# Patient Record
Sex: MALE | Race: BLACK OR AFRICAN AMERICAN | Employment: UNEMPLOYED | URBAN - METROPOLITAN AREA
[De-identification: names, ages, dates, MRNs, and addresses within clinical notes are randomized per-mention and may not be internally consistent; named-entity substitution may affect disease eponyms.]

---
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
## 2019-09-02 ENCOUNTER — APPOINTMENT (OUTPATIENT)
Dept: GENERAL RADIOLOGY | Age: 19
End: 2019-09-02
Attending: EMERGENCY MEDICINE
Payer: COMMERCIAL

## 2019-09-02 ENCOUNTER — HOSPITAL ENCOUNTER (EMERGENCY)
Age: 19
Discharge: HOME | End: 2019-09-02
Attending: EMERGENCY MEDICINE
Payer: COMMERCIAL

## 2019-09-02 VITALS
WEIGHT: 268.08 LBS | TEMPERATURE: 98.7 F | RESPIRATION RATE: 14 BRPM | SYSTOLIC BLOOD PRESSURE: 135 MMHG | HEART RATE: 63 BPM | DIASTOLIC BLOOD PRESSURE: 77 MMHG | BODY MASS INDEX: 33.33 KG/M2 | HEIGHT: 75 IN | OXYGEN SATURATION: 100 %

## 2019-09-02 DIAGNOSIS — S83.92XA SPRAIN OF LEFT KNEE, UNSPECIFIED LIGAMENT, INITIAL ENCOUNTER: Primary | ICD-10-CM

## 2019-09-02 PROCEDURE — 73562 X-RAY EXAM OF KNEE 3: CPT

## 2019-09-02 PROCEDURE — 99284 EMERGENCY DEPT VISIT MOD MDM: CPT

## 2019-09-02 PROCEDURE — L1830 KO IMMOB CANVAS LONG PRE OTS: HCPCS

## 2019-09-02 NOTE — ED NOTES
The patient was discharged home  in stable condition. The patient is alert and oriented, in no respiratory distress and discharge vital signs obtained. The patient's diagnosis, condition and treatment were explained. The patient expressed understanding. No prescriptions given. No work/school note given. A discharge plan has been developed. A  was not involved in the process. Aftercare instructions were given. Pt ambulatory out of the ED wearing knee immobilizer. Pt discharged from the ED with family.

## 2019-09-02 NOTE — DISCHARGE INSTRUCTIONS
Patient Education        Knee Sprain: Care Instructions  Your Care Instructions    A knee sprain is one or more stretched, partly torn, or completely torn knee ligaments. Ligaments are bands of ropelike tissue that connect bone to bone and make the knee stable. The knee has four main ligaments. Knee sprains often happen because of a twisting or bending injury from sports such as skiing, basketball, soccer, or football. The knee turns one way while the lower or upper leg goes another way. A sprain also can happen when the knee is hit from the side or the front. If a knee ligament is slightly stretched, you will probably need only home treatment. You may need a splint or brace (immobilizer) for a partly torn ligament. A complete tear may need surgery. A minor knee sprain may take up to 6 weeks to heal, while a severe sprain may take months. Follow-up care is a key part of your treatment and safety. Be sure to make and go to all appointments, and call your doctor if you are having problems. It's also a good idea to know your test results and keep a list of the medicines you take. How can you care for yourself at home? · Follow instructions about how much weight you can put on your leg and how to walk with crutches. · Prop up your leg on a pillow when you ice it or anytime you sit or lie down for the next 3 days. Try to keep it above the level of your heart. This will help reduce swelling. · Put ice or a cold pack on your knee for 10 to 20 minutes at a time. Try to do this every 1 to 2 hours for the next 3 days (when you are awake) or until the swelling goes down. Put a thin cloth between the ice and your skin. Do not get the splint wet. · If you have an elastic bandage, make sure it is snug but not so tight that your leg is numb, tingles, or swells below the bandage. You can loosen the bandage if it is too tight. · Your doctor may recommend a brace (immobilizer) to support your knee while it heals.  Wear it as directed. · Ask your doctor if you can take an over-the-counter pain medicine, such as acetaminophen (Tylenol), ibuprofen (Advil, Motrin), or naproxen (Aleve). Be safe with medicines. Read and follow all instructions on the label. When should you call for help? Call 911 anytime you think you may need emergency care. For example, call if:    · You have sudden chest pain and shortness of breath, or you cough up blood.    Call your doctor now or seek immediate medical care if:    · You have increased or severe pain.     · You cannot move your toes or ankle.     · Your foot is cool or pale or changes color.     · You have tingling, weakness, or numbness in your foot or leg.     · Your splint or brace feels too tight.     · You are unable to straighten the knee, or the knee \"locks. \"     · You have signs of a blood clot in your leg, such as:  ? Pain in your calf, back of the knee, thigh, or groin. ? Redness and swelling in your leg.    Watch closely for changes in your health, and be sure to contact your doctor if:    · Your pain is not getting better or is getting worse. Where can you learn more? Go to http://jose-maximino.info/. Enter N406 in the search box to learn more about \"Knee Sprain: Care Instructions. \"  Current as of: September 20, 2018  Content Version: 12.1  © 0003-9452 [employer]. Care instructions adapted under license by [employer] (which disclaims liability or warranty for this information). If you have questions about a medical condition or this instruction, always ask your healthcare professional. [place] [address] any warranty or liability for your use of this information. Patient Education        Learning About RICE (Rest, Ice, Compression, and Elevation)  What is RICE? RICE is a way to care for an injury. RICE helps relieve pain and swelling. It may also help with healing and flexibility.  RICE stands for:  · Rest and protect the injured or sore area. · Ice or a cold pack used as soon as possible. · Compression, or wrapping the injured or sore area with an elastic bandage. · Elevation (propping up) the injured or sore area. How do you do RICE? You can use RICE for home treatment when you have general aches and pains or after an injury or surgery. Rest  · Do not put weight on the injury for at least 24 to 48 hours. · Use crutches for a badly sprained knee or ankle. · Support a sprained wrist, elbow, or shoulder with a sling. Ice  · Put ice or a cold pack on the injury right away to reduce pain and swelling. Frozen vegetables will also work as an ice pack. Put a thin cloth between the ice or cold pack and your skin. The cloth protects the injured area from getting too cold. · Use ice for 10 to 15 minutes at a time for the first 48 to 72 hours. Compression  · Use compression for sprains, strains, and surgeries of the arms and legs. · Wrap the injured area with an elastic bandage or compression sleeve to reduce swelling. · Don't wrap it too tightly. If the area below it feels numb, tingles, or feels cool, loosen the wrap. Elevation  · Use elevation for areas of the body that can be propped up, such as arms and legs. · Prop up the injured area on pillows whenever you use ice. Keep it propped up anytime you sit or lie down. · Try to keep the injured area at or above the level of your heart. This will help reduce swelling and bruising. Where can you learn more? Go to http://jose-maximino.info/. Enter G431 in the search box to learn more about \"Learning About RICE (Rest, Ice, Compression, and Elevation). \"  Current as of: September 20, 2018  Content Version: 12.1  © 0471-2795 [employer]. Care instructions adapted under license by [employer] (which disclaims liability or warranty for this information).  If you have questions about a medical condition or this instruction, always ask your healthcare professional. [place] [address] any warranty or liability for your use of this information. We hope that we have addressed all of your medical concerns. The examination and treatment you received in the Emergency Department were for an emergent problem and were not intended as complete care. It is important that you follow up with your healthcare provider(s) for ongoing care. If your symptoms worsen or do not improve as expected, and you are unable to reach your usual health care provider(s), you should return to the Emergency Department. Today's healthcare is undergoing tremendous change, and patient satisfaction surveys are one of the many tools to assess the quality of medical care. You may receive a survey from the [facility] regarding your experience in the Emergency Department. I hope that your experience has been completely positive, particularly the medical care that I provided. As such, please participate in the survey; anything less than excellent does not meet my expectations or intentions. [facility] and [address] [place] [address] participate in nationally recognized quality of care measures. If your blood pressure is greater than 120/80, as reported below, we urge that you seek medical care to address the potential of high blood pressure, commonly known as hypertension. Hypertension can be hereditary or can be caused by certain medical conditions, pain, stress, or \"white coat syndrome. \"       Please make an appointment with your health care provider(s) for follow up of your Emergency Department visit. VITALS:   Patient Vitals for the past 8 hrs:   Temp Pulse Resp BP SpO2   09/02/19 1700 98.7 °F (37.1 °C) 63 14 135/77 100 %          Thank you for allowing us to provide you with medical care today. We realize that you have many choices for your emergency care needs.   Please choose us in the future for any continued health care needs. [facility] Oneida Young, [address]: [phone]            No results found for this or any previous visit (from the past 24 hour(s)). Xr Knee Lt 3 V    Result Date: 9/2/2019  EXAM: XR KNEE LT 3 V INDICATION: injury. 4/27/2018 COMPARISON: None. FINDINGS: Three views of the left knee demonstrate no fracture or other acute osseous or articular abnormality. There is no effusion. Postsurgical changes are noted consistent with prior anterior cruciate ligament repair. IMPRESSION: No acute abnormality.

## 2019-09-02 NOTE — LETTER
[facility] EMERGENCY DEPT 
[address] [facility] [place] [address] 
[phone] Work/School Note Date: 9/2/2019 To Whom It May concern: 
 
Coco Briones was seen and treated today in the emergency room by the following provider(s): 
No providers found. Coco Briones may return to work on Wednesday 09/04/19. Sincerely, Yodit Aiken RN

## 2019-09-02 NOTE — ED TRIAGE NOTES
Pt ambulates to treatment area he states that he was warming up to go play basketball and he twisted his left knee.   He states that knee feels tight, and it's knee that he had surgery for ACL

## 2019-09-06 NOTE — ED PROVIDER NOTES
HPI   23 yo M presents with left knee pain occurring prior to arrival in ED. Pt was warming up to play basketball when he twisted his left knee. Pain anterior left knee, 2/10, nonradiating. Denies dislocation, weakness, numbness. Hx of surgery to same knee, ACL/meniscus. No other injury or complaint. Is able to walk and bear weight. Past Medical History:   Diagnosis Date    Asthma        Past Surgical History:   Procedure Laterality Date    HX ACL RECONSTRUCTION      left    KNEE SCOPE, Vainupea 50 TRANSPLANT           History reviewed. No pertinent family history.     Social History     Socioeconomic History    Marital status: SINGLE     Spouse name: Not on file    Number of children: Not on file    Years of education: Not on file    Highest education level: Not on file   Occupational History    Not on file   Social Needs    Financial resource strain: Not on file    Food insecurity:     Worry: Not on file     Inability: Not on file    Transportation needs:     Medical: Not on file     Non-medical: Not on file   Tobacco Use    Smoking status: Current Some Day Smoker    Smokeless tobacco: Never Used   Substance and Sexual Activity    Alcohol use: No    Drug use: Yes     Types: Marijuana    Sexual activity: Not on file   Lifestyle    Physical activity:     Days per week: Not on file     Minutes per session: Not on file    Stress: Not on file   Relationships    Social connections:     Talks on phone: Not on file     Gets together: Not on file     Attends Baptism service: Not on file     Active member of club or organization: Not on file     Attends meetings of clubs or organizations: Not on file     Relationship status: Not on file    Intimate partner violence:     Fear of current or ex partner: Not on file     Emotionally abused: Not on file     Physically abused: Not on file     Forced sexual activity: Not on file   Other Topics Concern    Not on file   Social History Narrative    Not on file ALLERGIES: Patient has no known allergies. Review of Systems   Constitutional: Negative for chills and fever. Respiratory: Negative for shortness of breath. Cardiovascular: Negative for chest pain and leg swelling. Musculoskeletal: Positive for arthralgias. Skin: Negative for rash and wound. Neurological: Negative for weakness and numbness. All other systems reviewed and are negative.       Vitals:    09/02/19 1700   BP: 135/77   Pulse: 63   Resp: 14   Temp: 98.7 °F (37.1 °C)   SpO2: 100%   Weight: 121.6 kg (268 lb 1.3 oz)   Height: 6' 3\" (1.905 m)            Physical Exam   Physical Examination: General appearance - alert, well appearing, and in no distress, oriented to person, place, and time and normal appearing weight  Eyes - pupils equal and reactive, extraocular eye movements intact  Neck - supple, no significant adenopathy  Chest - clear to auscultation, no wheezes, rales or rhonchi, symmetric air entry  Heart - normal rate, regular rhythm, normal S1, S2, no murmurs, rubs, clicks or gallops  Abdomen - soft, nontender, nondistended, no masses or organomegaly  Back exam - full range of motion, no tenderness, palpable spasm or pain on motion  Neurological - alert, oriented, normal speech, no focal findings or movement disorder noted  Musculoskeletal - mild tenderness to anterior left knee, no deformity, full rom, knee stable, NVI distally with strong pedal pulses  Extremities - peripheral pulses normal, no pedal edema, no clubbing or cyanosis  Skin - normal coloration and turgor, no rashes, no suspicious skin lesions noted  MDM  Number of Diagnoses or Management Options  Sprain of left knee, unspecified ligament, initial encounter:      Amount and/or Complexity of Data Reviewed  Tests in the radiology section of CPT®: ordered and reviewed  Obtain history from someone other than the patient: yes (family)  Independent visualization of images, tracings, or specimens: yes    Patient Progress  Patient progress: improved         Procedures  No fx on xray. Pt placed in knee immobilizer and examined after placement, good placement, NVI. F/u with ortho.

## 2019-09-07 ENCOUNTER — APPOINTMENT (OUTPATIENT)
Dept: ULTRASOUND IMAGING | Age: 19
End: 2019-09-07
Attending: NURSE PRACTITIONER
Payer: COMMERCIAL

## 2019-09-07 ENCOUNTER — HOSPITAL ENCOUNTER (EMERGENCY)
Age: 19
Discharge: HOME | End: 2019-09-07
Attending: EMERGENCY MEDICINE
Payer: COMMERCIAL

## 2019-09-07 VITALS
WEIGHT: 263 LBS | BODY MASS INDEX: 32.7 KG/M2 | SYSTOLIC BLOOD PRESSURE: 128 MMHG | DIASTOLIC BLOOD PRESSURE: 69 MMHG | OXYGEN SATURATION: 98 % | HEIGHT: 75 IN | TEMPERATURE: 98.8 F | RESPIRATION RATE: 16 BRPM | HEART RATE: 66 BPM

## 2019-09-07 DIAGNOSIS — M25.562 PAIN AND SWELLING OF LEFT KNEE: Primary | ICD-10-CM

## 2019-09-07 DIAGNOSIS — M25.462 PAIN AND SWELLING OF LEFT KNEE: Primary | ICD-10-CM

## 2019-09-07 PROCEDURE — 99284 EMERGENCY DEPT VISIT MOD MDM: CPT

## 2019-09-07 PROCEDURE — 74011250637 HC RX REV CODE- 250/637: Performed by: NURSE PRACTITIONER

## 2019-09-07 PROCEDURE — 93971 EXTREMITY STUDY: CPT

## 2019-09-07 RX ORDER — IBUPROFEN 600 MG/1
600 TABLET ORAL
Status: COMPLETED | OUTPATIENT
Start: 2019-09-07 | End: 2019-09-07

## 2019-09-07 RX ORDER — IBUPROFEN 800 MG/1
800 TABLET ORAL
COMMUNITY
End: 2019-09-07

## 2019-09-07 RX ORDER — ACETAMINOPHEN 500 MG
1000 TABLET ORAL
Qty: 50 TAB | Refills: 0 | Status: SHIPPED | OUTPATIENT
Start: 2019-09-07 | End: 2019-09-17

## 2019-09-07 RX ORDER — IBUPROFEN 600 MG/1
600 TABLET ORAL
Qty: 40 TAB | Refills: 0 | Status: SHIPPED | OUTPATIENT
Start: 2019-09-07 | End: 2019-09-17

## 2019-09-07 RX ADMIN — IBUPROFEN 600 MG: 600 TABLET, FILM COATED ORAL at 18:53

## 2019-09-07 NOTE — ED TRIAGE NOTES
Pt rpts left knee injury last Friday. Previous injury. Wearing a knee immobilizer without relief. + swelling with pain.

## 2019-09-07 NOTE — LETTER
21 [facility] EMERGENCY DEPT 
[address] [facility] [place] [facility] [address] 
[phone] Work/School Note Date: 9/7/2019 To Whom It May concern: 
 
Kana Lujan was seen and treated today in the emergency room by the following provider(s): 
Attending Provider: Socrates Lea MD 
Nurse Practitioner: Jimena Skaggs NP. Kana Lujan may return to work until cleared by [employer]. Sincerely, Tosin Tim NP

## 2019-09-07 NOTE — LETTER
21 [facility] EMERGENCY DEPT 
[address] [facility] [place] [address] 
[phone] Work/School Note Date: 9/7/2019 To Whom It May concern: 
 
Kristine Jeffries was seen and treated today in the emergency room by the following provider(s): 
Attending Provider: Verónica Alexandra MD 
Nurse Practitioner: Nery Catherine NP. Kristine Jeffries Special Instructions:  No work until cleared by orthopedics. Sincerely, Heavenly Quinonez MD

## 2019-09-07 NOTE — ED PROVIDER NOTES
Patient is a 21-year-old male with a past medical history significant for left ACL reconstruction and meniscal tear in August 2018 who is ambulatory to the ED today with his mother with complaints of recurrent left knee pain. Patient was seen in the ER 1 week ago for sprain of the left knee. Had an x-ray at that time that was negative. Patient states his pain had improved over the course of the week but at work today noticed swelling behind the knee and pain with walking. Patient states he is never had pain behind the knee before. Patient denies fever, chills. Patient is able to bear weight. Patient arrives to the ED with knee and a knee brace. Patient has no further complaints at this time. Mom is requesting MRI at this time. Patient has an appointment in 5 days with orthopedics for follow-up. Primary care provider:Flash Leahy MD    The history is provided by the patient and a parent. No  was used. Past Medical History:   Diagnosis Date    Asthma      Past Surgical History:   Procedure Laterality Date    HX ACL RECONSTRUCTION      left    KNEE SCOPE, Vainupea 50 TRANSPLANT       History reviewed. No pertinent family history.     Social History     Socioeconomic History    Marital status: SINGLE     Spouse name: Not on file    Number of children: Not on file    Years of education: Not on file    Highest education level: Not on file   Occupational History    Not on file   Social Needs    Financial resource strain: Not on file    Food insecurity:     Worry: Not on file     Inability: Not on file    Transportation needs:     Medical: Not on file     Non-medical: Not on file   Tobacco Use    Smoking status: Current Some Day Smoker    Smokeless tobacco: Never Used   Substance and Sexual Activity    Alcohol use: No    Drug use: Yes     Types: Marijuana    Sexual activity: Not on file   Lifestyle    Physical activity:     Days per week: Not on file     Minutes per session: Not on file    Stress: Not on file   Relationships    Social connections:     Talks on phone: Not on file     Gets together: Not on file     Attends Buddhism service: Not on file     Active member of club or organization: Not on file     Attends meetings of clubs or organizations: Not on file     Relationship status: Not on file    Intimate partner violence:     Fear of current or ex partner: Not on file     Emotionally abused: Not on file     Physically abused: Not on file     Forced sexual activity: Not on file   Other Topics Concern    Not on file   Social History Narrative    Not on file     ALLERGIES: Patient has no known allergies. Review of Systems   Musculoskeletal: Positive for arthralgias and myalgias. Negative for gait problem. All other systems reviewed and are negative. Vitals:    09/07/19 1821   BP: 138/70   Pulse: 64   Resp: 16   Temp: 98.8 °F (37.1 °C)   SpO2: 98%   Weight: 119.3 kg (263 lb)   Height: 6' 3\" (1.905 m)          Physical Exam   Constitutional: He appears well-developed and well-nourished. HENT:   Head: Atraumatic. Eyes: EOM are normal.   Neck: No tracheal deviation present. Pulmonary/Chest: Effort normal. No respiratory distress. Musculoskeletal: Normal range of motion. Left knee: He exhibits swelling. He exhibits normal range of motion, no effusion, no ecchymosis, no deformity, no laceration, no bony tenderness, normal meniscus and no MCL laxity. Tenderness found. Left knee with mild tenderness to the posterior knee. No deformity, full range of motion, knee is stable without varus or valgus stress pain. Neurovascularly intact distally with palpable pedal pulses. Neurological: He is alert. Skin: Skin is warm and dry. Psychiatric: He has a normal mood and affect. His behavior is normal. Judgment and thought content normal.   Nursing note and vitals reviewed.      [facility]       Procedures      Assessment & Plan:     Orders Placed This Encounter    ibuprofen (MOTRIN) 800 mg tablet    ibuprofen (MOTRIN) tablet 600 mg       Discussed with Georgi Guerra MD,ED Provider    Fransisca Mercedes NP  09/07/19  6:27 PM    Vascular tech took approximately 1 hour to arrive at the facility. Patient was taken to ultrasound. Fransisca Mercedes NP  09/07/19  7:32 PM    Ultrasound without evidence of Baker's cyst.  We will have the patient follow-up with Dr. Juan Jose Sagastume at [address] [place] [address]. Tylenol ibuprofen alternating every 6 hours. Knee immobilizer. Discussed return precautions. 8:23 PM  Patient re-evaluated. All questions answered. Patient appropriate for discharge. Given return precautions and follow up instructions. LABORATORY TESTS:  Labs Reviewed - No data to display    IMAGING RESULTS:  No orders to display       MEDICATIONS GIVEN:  Medications   ibuprofen (MOTRIN) tablet 600 mg (600 mg Oral Given 9/7/19 1583)       IMPRESSION:  No diagnosis found. PLAN:  1. Current Discharge Medication List      START taking these medications    Details   acetaminophen (TYLENOL) 500 mg tablet Take 2 Tabs by mouth every six (6) hours as needed for Pain for up to 10 days. Qty: 50 Tab, Refills: 00         CONTINUE these medications which have CHANGED    Details   ibuprofen (MOTRIN) 600 mg tablet Take 1 Tab by mouth every six (6) hours as needed for Pain for up to 10 days. Qty: 40 Tab, Refills: 0           2. Follow-up Information     Follow up With Specialties Details Why Contact Info    Becky Salazar MD Orthopedic Surgery Schedule an appointment as soon as possible for a visit  [address] [facility]  [place] [id number] [place] [address]  [phone]      Your primary care provider  Schedule an appointment as soon as possible for a visit As needed     [address] [facility] Emergency Medicine  As needed, If symptoms worsen [address]  [phone]        3.      Return to ED for new or worsening symptoms       Willye Hand Brent Oliveros NP        Please note that this dictation was completed with [employer], the computer voice recognition software. Quite often unanticipated grammatical, syntax, homophones, and other interpretive errors are inadvertently transcribed by the computer software. Please disregard these errors. Please excuse any errors that have escaped final proofreading.

## 2019-09-08 NOTE — DISCHARGE INSTRUCTIONS
Thank you for allowing us to care for you today. Please follow-up with your Primary Care provider in the next 2-3 days if your symptoms do not improve. Plan for home:     Ibuprofen 600 mg every 6-8 hours for pain. Use on a schedule for the next 3 days. Follow-up with Orthopedics. You may pick one you have seen before. There is a recommendation listed below. Knee brace when walking. RICE therapy:  \"R\" is for rest. Please rest the injured area  \"I\" is for ice. Please ice the area and 20 minute increments multiple times daily. Make sure you have at least 20 minutes between each ice application. Do not place ice directly on the skin as it can cause frostbite. \"C\" is for compression. You may use a light Ace wrap to compress the area to help the swelling. Make sure you have no numbness and tingling past the Ace wrap. \"E\" is for elevate. Keep the swollen area elevated as much as possible. Elevate above the heart whenever possible.      [facility] [place]:   Appointments:   [address] [facility]  Appointments: [phone]    Orthopedics:   Appointments:   Phone: [phone]  Fax: [phone] On Call  Ortho On Call  Sandra Corporal On Call  Ortho On Call     [facility] On Call  Ortho On Call   [facility]   [facility], [address] [place] [address]  61 [facility]ician Office and Physical Therapy    Phone: [phone]  Physicians Fax: [phone]  Physical Therapy Fax: [phone] [facility]:   [address] [facility], [address],  [facility], [address] [place], [place] [address]way [address] [place] [address]  Physician Office  Physical Therapy    Lynnette Quiroga [facility] EMERGENCY [facility]  Physician Office  Physical Therapy  Office [address] [facility]  Physician Office  Physical Therapy  Hand Therapy [place]  In [address]  [address]  [place], [address] [facility]    [address] Physician Office  [address] Physical Therapy  Phone: [phone]  Physicians Fax: [phone]  Physical Therapy Fax: [phone]   [place]:  In the [facility] [address]. [address], [address] [facility] [address],  [place], [address] [facility]  Physician Office  Physical Therapy    Neftali Calabrese  Physician Office  Physical Therapy    Liz  Physician Office  Physical Therapy   [address] [facility] Office Building  [facility] [address] [place] [address] Ave    [address] Office  [address] [facility]  [address] Physical Therapy    Phone: [phone]  Physicians Suite Fax: [phone]  Physical Therapy Suite Fax: [phone] Location:  [address] [place] [address],[address],  [address]. [address]         Patient Education        Knee Pain or Injury: Care Instructions  Your Care Instructions    Injuries are a common cause of knee problems. Sudden (acute) injuries may be caused by a direct blow to the knee. They can also be caused by abnormal twisting, bending, or falling on the knee. Pain, bruising, or swelling may be severe, and may start within minutes of the injury. Overuse is another cause of knee pain. Other causes are climbing stairs, kneeling, and other activities that use the knee. Everyday wear and tear, especially as you get older, also can cause knee pain. Rest, along with home treatment, often relieves pain and allows your knee to heal. If you have a serious knee injury, you may need tests and treatment. Follow-up care is a key part of your treatment and safety. Be sure to make and go to all appointments, and call your doctor if you are having problems. It's also a good idea to know your test results and keep a list of the medicines you take. How can you care for yourself at home? · Be safe with medicines. Read and follow all instructions on the label. ? If the doctor gave you a prescription medicine for pain, take it as prescribed.   ? If you are not taking a prescription pain medicine, ask your doctor if you can take an over-the-counter medicine. · Rest and protect your knee. Take a break from any activity that may cause pain. · Put ice or a cold pack on your knee for 10 to 20 minutes at a time. Put a thin cloth between the ice and your skin. · Prop up a sore knee on a pillow when you ice it or anytime you sit or lie down for the next 3 days. Try to keep it above the level of your heart. This will help reduce swelling. · If your knee is not swollen, you can put moist heat, a heating pad, or a warm cloth on your knee. · If your doctor recommends an elastic bandage, sleeve, or other type of support for your knee, wear it as directed. · Follow your doctor's instructions about how much weight you can put on your leg. Use a cane, crutches, or a walker as instructed. · Follow your doctor's instructions about activity during your healing process. If you can do mild exercise, slowly increase your activity. · Reach and stay at a healthy weight. Extra weight can strain the joints, especially the knees and hips, and make the pain worse. Losing even a few pounds may help. When should you call for help? Call 911 anytime you think you may need emergency care. For example, call if:    · You have symptoms of a blood clot in your lung (called a pulmonary embolism). These may include:  ? Sudden chest pain. ? Trouble breathing. ? Coughing up blood.    Call your doctor now or seek immediate medical care if:    · You have severe or increasing pain.     · Your leg or foot turns cold or changes color.     · You cannot stand or put weight on your knee.     · Your knee looks twisted or bent out of shape.     · You cannot move your knee.     · You have signs of infection, such as:  ? Increased pain, swelling, warmth, or redness. ? Red streaks leading from the knee. ? Pus draining from a place on your knee.   ? A fever.     · You have signs of a blood clot in your leg (called a deep vein thrombosis), such as:  ? Pain in your calf, back of the knee, thigh, or groin. ? Redness and swelling in your leg or groin.    Watch closely for changes in your health, and be sure to contact your doctor if:    · You have tingling, weakness, or numbness in your knee.     · You have any new symptoms, such as swelling.     · You have bruises from a knee injury that last longer than 2 weeks.     · You do not get better as expected. Where can you learn more? Go to http://jose-maximino.info/. Enter K195 in the search box to learn more about \"Knee Pain or Injury: Care Instructions. \"  Current as of: September 23, 2018  Content Version: 12.1  © 8561-3566 [employer]. Care instructions adapted under license by [employer] (which disclaims liability or warranty for this information). If you have questions about a medical condition or this instruction, always ask your healthcare professional. [address] any warranty or liability for your use of this information.

## 2019-09-08 NOTE — ED NOTES
The patient was discharged home in stable condition. The patient is alert and oriented, in no respiratory distress and discharge vital signs obtained. The patient's diagnosis, condition and treatment were explained. The patient expressed understanding. prescriptions given. work/school note given. A discharge plan has been developed. A  was not involved in the process. Aftercare instructions were given. Pt ambulatory out of the ED. Pt discharged from the ED with family.

## 2020-02-26 ENCOUNTER — HOSPITAL ENCOUNTER (OUTPATIENT)
Dept: GENERAL RADIOLOGY | Age: 20
Discharge: HOME | End: 2020-02-26
Payer: COMMERCIAL

## 2020-02-26 DIAGNOSIS — M25.562 LEFT KNEE PAIN: ICD-10-CM

## 2020-02-26 PROCEDURE — 73560 X-RAY EXAM OF KNEE 1 OR 2: CPT

## 2020-02-26 PROCEDURE — 73565 X-RAY EXAM OF KNEES: CPT

## 2022-06-08 ENCOUNTER — NURSE TRIAGE (OUTPATIENT)
Dept: OTHER | Facility: CLINIC | Age: 22
End: 2022-06-08

## 2022-11-14 ENCOUNTER — APPOINTMENT (OUTPATIENT)
Dept: GENERAL RADIOLOGY | Age: 22
End: 2022-11-14
Attending: FAMILY MEDICINE
Payer: MEDICAID

## 2022-11-14 ENCOUNTER — HOSPITAL ENCOUNTER (EMERGENCY)
Age: 22
Discharge: HOME | End: 2022-11-14
Attending: FAMILY MEDICINE
Payer: MEDICAID

## 2022-11-14 VITALS
BODY MASS INDEX: 33.57 KG/M2 | WEIGHT: 270 LBS | DIASTOLIC BLOOD PRESSURE: 77 MMHG | OXYGEN SATURATION: 99 % | SYSTOLIC BLOOD PRESSURE: 128 MMHG | RESPIRATION RATE: 18 BRPM | TEMPERATURE: 98.4 F | HEIGHT: 75 IN | HEART RATE: 89 BPM

## 2022-11-14 DIAGNOSIS — S89.92XA INJURY OF LEFT KNEE, INITIAL ENCOUNTER: Primary | ICD-10-CM

## 2022-11-14 PROCEDURE — 99284 EMERGENCY DEPT VISIT MOD MDM: CPT

## 2022-11-14 PROCEDURE — 74011250636 HC RX REV CODE- 250/636: Performed by: FAMILY MEDICINE

## 2022-11-14 PROCEDURE — 73562 X-RAY EXAM OF KNEE 3: CPT

## 2022-11-14 PROCEDURE — 96372 THER/PROPH/DIAG INJ SC/IM: CPT

## 2022-11-14 RX ORDER — NAPROXEN 500 MG/1
500 TABLET ORAL
Qty: 20 TABLET | Refills: 0 | Status: SHIPPED | OUTPATIENT
Start: 2022-11-14 | End: 2022-11-24

## 2022-11-14 RX ORDER — IBUPROFEN 800 MG/1
800 TABLET ORAL
Qty: 15 TABLET | Refills: 0 | Status: SHIPPED | OUTPATIENT
Start: 2022-11-14 | End: 2022-11-19

## 2022-11-14 RX ORDER — KETOROLAC TROMETHAMINE 30 MG/ML
30 INJECTION, SOLUTION INTRAMUSCULAR; INTRAVENOUS
Status: COMPLETED | OUTPATIENT
Start: 2022-11-14 | End: 2022-11-14

## 2022-11-14 RX ADMIN — KETOROLAC TROMETHAMINE 30 MG: 30 INJECTION, SOLUTION INTRAMUSCULAR; INTRAVENOUS at 22:43

## 2022-11-14 NOTE — Clinical Note
[place] [address]  [address] [place] [id number]  [phone]    Work/School Note    Date: 11/14/2022    To Whom It May concern:    Sterling Escalera was seen and treated today in the emergency room by the following provider(s):  Attending Provider: Amey Lesch, DO. Sterling Escalera is excused from work/school on 11/14/22 and 11/15/22. He is medically clear to return to work/school on 11/16/2022.        Sincerely,          Karla Newton DO

## 2022-11-15 NOTE — DISCHARGE INSTRUCTIONS
Thank you! Thank you for allowing me to care for you in the emergency department. It is my goal to provide you with excellent care. If you have not received excellent quality care, please ask to speak to the nurse manager. Please fill out the survey that will come to you by mail or email since we listen to your feedback! Below you will find a list of your tests from today's visit. Should you have any questions, please do not hesitate to call the emergency department. Labs  No results found for this or any previous visit (from the past 12 hour(s)). Radiologic Studies  XR KNEE LT 3 V   Final Result   No acute fracture. Postoperative changes and anterior soft tissue swelling. Patella moni. CT Results  (Last 48 hours)      None          CXR Results  (Last 48 hours)      None          ------------------------------------------------------------------------------------------------------------  The exam and treatment you received in the Emergency Department were for an urgent problem and are not intended as complete care. It is important that you follow-up with a doctor, nurse practitioner, or physician assistant to:  (1) confirm your diagnosis,  (2) re-evaluation of changes in your illness and treatment, and  (3) for ongoing care. Please take your discharge instructions with you when you go to your follow-up appointment. If you have any problem arranging a follow-up appointment, contact the Emergency Department. If your symptoms become worse or you do not improve as expected and you are unable to reach your health care provider, please return to the Emergency Department. We are available 24 hours a day. If a prescription has been provided, please have it filled as soon as possible to prevent a delay in treatment.  If you have any questions or reservations about taking the medication due to side effects or interactions with other medications, please call your primary care provider or contact the ER.

## 2022-11-15 NOTE — ED TRIAGE NOTES
Pt c/o left knee being \"out of place\" for 1 hr; injury occurred while pt was playing basketball; pt ambulated without assistance

## 2022-11-18 NOTE — ED PROVIDER NOTES
EMERGENCY DEPARTMENT HISTORY AND PHYSICAL EXAM      Date: 11/14/2022  Patient Name: Jimmie Hinton    History of Presenting Illness     Chief Complaint   Patient presents with    Knee Injury       History Provided By:     HPI: Jimmie Hinton, is a very pleasant 24 y.o. male presenting to the ED with a chief complaint of left knee pain. Patient states he developed knee pain approximate 1 hour ago after playing basketball. He felt like his knee came out of place. He has been able to ambulate but it is painful. Pain is achy and nonradiating. The patient denies any other symptoms at this time. PCP: Flash Leahy MD    No current facility-administered medications on file prior to encounter. No current outpatient medications on file prior to encounter. Past History     Past Medical History:  Past Medical History:   Diagnosis Date    Asthma        Past Surgical History:  Past Surgical History:   Procedure Laterality Date    HX ACL RECONSTRUCTION      left    DE KNEE SCOPE, [address] TRANSPLANT         Family History:  History reviewed. No pertinent family history. Social History:  Social History     Tobacco Use    Smoking status: Some Days    Smokeless tobacco: Never   Substance Use Topics    Alcohol use: No    Drug use: Yes     Types: Marijuana       Allergies:  No Known Allergies      Review of Systems     Review of Systems   Constitutional:  Negative for activity change, appetite change, chills, fatigue and fever. HENT:  Negative for congestion and sore throat. Eyes:  Negative for photophobia and visual disturbance. Respiratory:  Negative for cough, shortness of breath and wheezing. Cardiovascular:  Negative for chest pain, palpitations and leg swelling. Gastrointestinal:  Negative for abdominal pain, diarrhea, nausea and vomiting. Endocrine: Negative for cold intolerance and heat intolerance. Musculoskeletal:  Positive for arthralgias. Negative for gait problem and joint swelling. Skin:  Negative for color change and rash. Neurological:  Negative for dizziness and headaches. Physical Exam     Physical Exam  Constitutional:       General: He is not in acute distress. Appearance: Normal appearance. He is not toxic-appearing. HENT:      Head: Normocephalic and atraumatic. Right Ear: External ear normal.      Left Ear: External ear normal.      Mouth/Throat:      Mouth: Mucous membranes are moist.      Pharynx: Oropharynx is clear. Eyes:      Extraocular Movements: Extraocular movements intact. Conjunctiva/sclera: Conjunctivae normal.   Cardiovascular:      Rate and Rhythm: Normal rate and regular rhythm. Pulses: Normal pulses. Heart sounds: Normal heart sounds. Pulmonary:      Effort: Pulmonary effort is normal. No respiratory distress. Breath sounds: Normal breath sounds. No wheezing, rhonchi or rales. Abdominal:      General: There is no distension. Palpations: Abdomen is soft. Tenderness: There is no abdominal tenderness. There is no guarding or rebound. Musculoskeletal:         General: No deformity. Cervical back: Normal range of motion and neck supple. Right lower leg: No edema. Left lower leg: No edema. Comments: No tenderness to palpation of the distal femur. No tenderness to palpation over the proximal tibia nor fibula. Joint effusion palpable. Patella is nontender. No laxity of the medial nor collateral ligaments. ACL and PCL testing is unremarkable. Skin:     Capillary Refill: Capillary refill takes less than 2 seconds. Findings: No erythema or rash. Neurological:      General: No focal deficit present. Mental Status: He is alert and oriented to person, place, and time.       Gait: Gait normal.   Psychiatric:         Mood and Affect: Mood normal.         Behavior: Behavior normal.       Lab and Diagnostic Study Results     Labs -   No results found for this or any previous visit (from the past 12 hour(s)). Radiologic Studies -   @lastxrresult@  CT Results  (Last 48 hours)      None          CXR Results  (Last 48 hours)      None              Medical Decision Making   - I am the first provider for this patient. - I reviewed the vital signs, available nursing notes, past medical history, past surgical history, family history and social history. - Initial assessment performed. The patients presenting problems have been discussed, and they are in agreement with the care plan formulated and outlined with them. I have encouraged them to ask questions as they arise throughout their visit. Vital Signs-Reviewed the patient's vital signs. No data found. ED Course/ Provider Notes (Medical Decision Making):     Patient presented to the emergency department with the aforementioned chief complaint. On examination the patient is nontoxic. Vitals were reviewed per above. Patient neurovascularly intact. X-ray shows No acute fracture. Postoperative changes and anterior soft tissue swelling. Patella moni. Concern for possible meniscal versus ligamentous injury. Patient placed in knee immobilizer, nonweightbearing, given crutches. Information follow-up with orthopedics. Varsha Guo was given a thorough list of signs and symptoms that would warrant an immediate return to the emergency department. Otherwise Varsha Guo will follow up with PCP. Procedures   Medical Decision Makingedical Decision Making  Performed by: Violetta Troncoso DO  Procedures  None       Disposition   Disposition:     Home     All of the diagnostic tests were reviewed and questions answered. Diagnosis, care plan and treatment options were discussed. The patient understands the instructions and will follow up as directed. The patients results have been reviewed with them. They have been counseled regarding their diagnosis.   The patient verbally convey understanding and agreement of the signs, symptoms, diagnosis, treatment and prognosis and additionally agrees to follow up as recommended with their PCP in 24 - 48 hours. They also agree with the care-plan and convey that all of their questions have been answered. I have also put together some discharge instructions for them that include: 1) educational information regarding their diagnosis, 2) how to care for their diagnosis at home, as well a 3) list of reasons why they would want to return to the ED prior to their follow-up appointment, should their condition change. DISCHARGE PLAN:    1. Cannot display discharge medications since this patient is not currently admitted. 2.   Follow-up Information       Follow up With Specialties Details Why Contact Info    Your primary care doctor  Schedule an appointment as soon as possible for a visit in 2 days      [facility]  Call today  [address]  [phone]              3.  Return to ED if worse       4. Discharge Medication List as of 11/14/2022 10:46 PM        START taking these medications    Details   naproxen (NAPROSYN) 500 mg tablet Take 1 Tablet by mouth two (2) times daily as needed for Pain for up to 10 days. , Normal, Disp-20 Tablet, R-0      ibuprofen (MOTRIN) 800 mg tablet Take 1 Tablet by mouth every eight (8) hours as needed for Pain for up to 5 days. , Normal, Disp-15 Tablet, R-0               Diagnosis     Clinical Impression:    1. Injury of left knee, initial encounter        Attestations:    Benita Finch, DO    Please note that this dictation was completed with [employer], the computer voice recognition software. Quite often unanticipated grammatical, syntax, homophones, and other interpretive errors are inadvertently transcribed by the computer software. Please disregard these errors. Please excuse any errors that have escaped final proofreading. Thank you.